# Patient Record
Sex: FEMALE | Race: WHITE | NOT HISPANIC OR LATINO | URBAN - NONMETROPOLITAN AREA
[De-identification: names, ages, dates, MRNs, and addresses within clinical notes are randomized per-mention and may not be internally consistent; named-entity substitution may affect disease eponyms.]

---

## 2024-09-12 ENCOUNTER — APPOINTMENT (OUTPATIENT)
Dept: CARDIOLOGY | Facility: CLINIC | Age: 74
End: 2024-09-12
Payer: COMMERCIAL

## 2024-09-12 VITALS
HEART RATE: 70 BPM | HEIGHT: 66 IN | BODY MASS INDEX: 30.7 KG/M2 | SYSTOLIC BLOOD PRESSURE: 166 MMHG | DIASTOLIC BLOOD PRESSURE: 98 MMHG | WEIGHT: 191 LBS

## 2024-09-12 DIAGNOSIS — R00.2 PALPITATIONS: ICD-10-CM

## 2024-09-12 DIAGNOSIS — R94.31 ABNORMAL EKG: ICD-10-CM

## 2024-09-12 DIAGNOSIS — R07.89 OTHER CHEST PAIN: Primary | ICD-10-CM

## 2024-09-12 DIAGNOSIS — E78.2 MIXED HYPERLIPIDEMIA: ICD-10-CM

## 2024-09-12 DIAGNOSIS — R06.02 SHORTNESS OF BREATH: ICD-10-CM

## 2024-09-12 DIAGNOSIS — I10 ESSENTIAL HYPERTENSION: ICD-10-CM

## 2024-09-12 PROBLEM — E78.5 HYPERLIPIDEMIA: Status: ACTIVE | Noted: 2024-09-12

## 2024-09-12 PROBLEM — I48.0 PAROXYSMAL ATRIAL FIBRILLATION (MULTI): Status: RESOLVED | Noted: 2024-09-12 | Resolved: 2024-09-12

## 2024-09-12 PROBLEM — E78.5 HYPERLIPIDEMIA: Status: RESOLVED | Noted: 2024-09-12 | Resolved: 2024-09-12

## 2024-09-12 PROBLEM — I48.0 PAROXYSMAL ATRIAL FIBRILLATION (MULTI): Status: ACTIVE | Noted: 2024-09-12

## 2024-09-12 PROCEDURE — 3008F BODY MASS INDEX DOCD: CPT | Performed by: INTERNAL MEDICINE

## 2024-09-12 PROCEDURE — 93000 ELECTROCARDIOGRAM COMPLETE: CPT | Performed by: INTERNAL MEDICINE

## 2024-09-12 PROCEDURE — 3077F SYST BP >= 140 MM HG: CPT | Performed by: INTERNAL MEDICINE

## 2024-09-12 PROCEDURE — 1159F MED LIST DOCD IN RCRD: CPT | Performed by: INTERNAL MEDICINE

## 2024-09-12 PROCEDURE — 99204 OFFICE O/P NEW MOD 45 MIN: CPT | Performed by: INTERNAL MEDICINE

## 2024-09-12 PROCEDURE — 3079F DIAST BP 80-89 MM HG: CPT | Performed by: INTERNAL MEDICINE

## 2024-09-12 PROCEDURE — 1036F TOBACCO NON-USER: CPT | Performed by: INTERNAL MEDICINE

## 2024-09-12 RX ORDER — HYDROCHLOROTHIAZIDE 25 MG/1
25 TABLET ORAL DAILY
COMMUNITY

## 2024-09-12 RX ORDER — METOPROLOL TARTRATE 25 MG/1
37.5 TABLET, FILM COATED ORAL 2 TIMES DAILY
Qty: 270 TABLET | Refills: 3 | Status: SHIPPED | OUTPATIENT
Start: 2024-09-12 | End: 2024-09-13 | Stop reason: SDUPTHER

## 2024-09-12 RX ORDER — BISMUTH SUBSALICYLATE 262 MG
1 TABLET,CHEWABLE ORAL DAILY
COMMUNITY

## 2024-09-12 RX ORDER — METOPROLOL TARTRATE 25 MG/1
25 TABLET, FILM COATED ORAL 2 TIMES DAILY
COMMUNITY
End: 2024-09-12 | Stop reason: SDUPTHER

## 2024-09-12 RX ORDER — ASPIRIN 81 MG/1
81 TABLET ORAL EVERY OTHER DAY
COMMUNITY

## 2024-09-12 ASSESSMENT — ENCOUNTER SYMPTOMS: PALPITATIONS: 1

## 2024-09-12 NOTE — LETTER
September 12, 2024     aMtt Atwood DO  [address]  [place] [address]    Patient: Denia Parmar   YOB: 1950   Date of Visit: 9/12/2024       Dear Dr. Matt Atwood, DO:    Thank you for referring Denia Parmar to me for evaluation. Below are my notes for this consultation.  If you have questions, please do not hesitate to call me. I look forward to following your patient along with you.       Sincerely,     Radha Schulz MD      CC: No Recipients  ______________________________________________________________________________________    Cardiology Consultation- New Consult    Reason for referral: Chest pain and shortness of breath    HPI: Denia Parmar is a 73 y.o. female who has been seen by our group in the past.  She scheduled this appointment because of complaint of chest pain and shortness of breath.  She does have a history of hypertension for years.  She was evaluated weight back in 2010 and underwent cardiac catheterization echo and a stress test all appears to be reassuring including negative cardiac catheterization.  Her echocardiogram showed mild increased wall thickness.  She is known to have history of grossly abnormal EKG.  She reports recently had an episode of chest pain at rest.  She did not seek medical attention.  She described as a pressure and heaviness in her chest.  She does report some exertional chest pain and shortness of breath.  She is reasonably active and described functional class II.  She does have some arthritis.  She denies lightheadedness, dizziness or syncope.  Her EKG showed sinus rhythm but dramatic ST-T changes following anterior and lateral ST-T changes suspicious of the possibility of hypertrophic cardiomyopathy.  She describe occasional palpitation with elevated heart rate.    Assessment    1.  Recent complaint of chest pain patient had negative cardiac catheterization back in 2009  2.  Intermittent  complaint of exertional chest pain and shortness of breath  3.  Intermittent palpitation and tachycardia  4.  Grossly abnormal EKG with anterolateral ST-T changes concerning about the possibility of hypertrophic cardiomyopathy  5.  Hyperlipidemia with LDL close to 180 patient meet the criteria for treatment  6.  Hypertension appears suboptimally controlled  7.  Obesity with BMI of 30    Plan    1.  I advised the patient to increase her metoprolol to 37.5 mg twice daily  2.  We discussed nonpharmacologic approach for management of hypertension including low-salt diet and DASH diet  3.  I advised her to proceed with Lexiscan myocardial fusion study and echocardiogram  4.  I advised the patient that she meet criteria for treatment with statin considering elevated risk score her 10 years risk is more than 26% but the patient declined  5.  Follow-up after testing is done  6.  I reviewed her recent lab work with her      Past Medical History:   She has no past medical history on file.    Surgical History:   She has a past surgical history that includes Other surgical history (03/25/2022); Other surgical history (03/25/2022); Other surgical history (03/25/2022); Other surgical history (03/25/2022); Other surgical history (03/25/2022); Other surgical history (03/25/2022); and Shoulder surgery (Right).    Family History:   Family History   Problem Relation Name Age of Onset   • Colon cancer Mother     • Diabetes Mother     • Heart attack Father     • Heart attack Brother         Social History:   Social History     Tobacco Use   • Smoking status: Never   • Smokeless tobacco: Never   Substance Use Topics   • Alcohol use: Never        Allergies:  Sulfamethoxazole-trimethoprim, Penicillamine, and Penicillins     Current Medications:    Current Outpatient Medications:   •  aspirin 81 mg EC tablet, Take 1 tablet (81 mg) by mouth every other day., Disp: , Rfl:   •  hydroCHLOROthiazide (HYDRODiuril) 25 mg tablet, Take 1 tablet (25  "mg) by mouth once daily., Disp: , Rfl:   •  multivitamin tablet, Take 1 tablet by mouth once daily., Disp: , Rfl:   •  metoprolol tartrate (Lopressor) 25 mg tablet, Take 1.5 tablets (37.5 mg) by mouth 2 times a day., Disp: 270 tablet, Rfl: 3     Vitals:  Vitals:    09/12/24 1415 09/12/24 1416   BP: 158/88 (!) 166/98   BP Location: Left arm Right arm   Patient Position: Sitting Sitting   Pulse: 70    Weight: 86.6 kg (191 lb)    Height: 1.676 m (5' 6\")           Review of Systems   Cardiovascular:  Positive for chest pain and palpitations.       Objective        Physical Exam  Constitutional:       Appearance: Normal appearance.   HENT:      Nose: Nose normal.   Neck:      Vascular: No carotid bruit.   Cardiovascular:      Rate and Rhythm: Normal rate.      Pulses: Normal pulses.      Heart sounds: Normal heart sounds.   Pulmonary:      Effort: Pulmonary effort is normal.   Abdominal:      General: Bowel sounds are normal.      Palpations: Abdomen is soft.   Musculoskeletal:         General: Normal range of motion.      Cervical back: Normal range of motion.      Right lower leg: No edema.      Left lower leg: No edema.   Skin:     General: Skin is warm and dry.   Neurological:      General: No focal deficit present.      Mental Status: She is alert.   Psychiatric:         Mood and Affect: Mood normal.         Behavior: Behavior normal.         Thought Content: Thought content normal.         Judgment: Judgment normal.                Assessment and Plan:   1. Other chest pain  Transthoracic Echo Complete    Nuclear Stress Test      2. Abnormal EKG  Transthoracic Echo Complete    Nuclear Stress Test      3. Palpitations  Follow Up In Cardiology    ECG 12 Lead    metoprolol tartrate (Lopressor) 25 mg tablet    Transthoracic Echo Complete    Nuclear Stress Test      4. Shortness of breath  Transthoracic Echo Complete    Nuclear Stress Test      5. Essential hypertension  metoprolol tartrate (Lopressor) 25 mg tablet    "   6. Mixed hyperlipidemia        7. BMI 30.0-30.9,adult               Scribe Attestation  By signing my name below, I, Alexia TURCIOS LPN  , Scribe   attest that this documentation has been prepared under the direction and in the presence of Radha Schulz MD.    Provider Attestation - Scribe documentation    All medical record entries made by the Scribe were at my direction and personally dictated by me. I have reviewed the chart and agree that the record accurately reflects my personal performance of the history, physical exam, discussion and plan.

## 2024-09-12 NOTE — PROGRESS NOTES
Cardiology Consultation- New Consult    Reason for referral: Chest pain and shortness of breath    HPI: Denia Parmar is a 73 y.o. female who has been seen by our group in the past.  She scheduled this appointment because of complaint of chest pain and shortness of breath.  She does have a history of hypertension for years.  She was evaluated weight back in 2010 and underwent cardiac catheterization echo and a stress test all appears to be reassuring including negative cardiac catheterization.  Her echocardiogram showed mild increased wall thickness.  She is known to have history of grossly abnormal EKG.  She reports recently had an episode of chest pain at rest.  She did not seek medical attention.  She described as a pressure and heaviness in her chest.  She does report some exertional chest pain and shortness of breath.  She is reasonably active and described functional class II.  She does have some arthritis.  She denies lightheadedness, dizziness or syncope.  Her EKG showed sinus rhythm but dramatic ST-T changes following anterior and lateral ST-T changes suspicious of the possibility of hypertrophic cardiomyopathy.  She describe occasional palpitation with elevated heart rate.    Assessment    1.  Recent complaint of chest pain patient had negative cardiac catheterization back in 2009  2.  Intermittent complaint of exertional chest pain and shortness of breath  3.  Intermittent palpitation and tachycardia  4.  Grossly abnormal EKG with anterolateral ST-T changes concerning about the possibility of hypertrophic cardiomyopathy  5.  Hyperlipidemia with LDL close to 180 patient meet the criteria for treatment  6.  Hypertension appears suboptimally controlled  7.  Obesity with BMI of 30    Plan    1.  I advised the patient to increase her metoprolol to 37.5 mg twice daily  2.  We discussed nonpharmacologic approach for management of hypertension including low-salt diet and DASH diet  3.  I advised her to proceed  "with Lexiscan myocardial fusion study and echocardiogram  4.  I advised the patient that she meet criteria for treatment with statin considering elevated risk score her 10 years risk is more than 26% but the patient declined  5.  Follow-up after testing is done  6.  I reviewed her recent lab work with her      Past Medical History:   She has no past medical history on file.    Surgical History:   She has a past surgical history that includes Other surgical history (03/25/2022); Other surgical history (03/25/2022); Other surgical history (03/25/2022); Other surgical history (03/25/2022); Other surgical history (03/25/2022); Other surgical history (03/25/2022); and Shoulder surgery (Right).    Family History:   Family History   Problem Relation Name Age of Onset    Colon cancer Mother      Diabetes Mother      Heart attack Father      Heart attack Brother         Social History:   Social History     Tobacco Use    Smoking status: Never    Smokeless tobacco: Never   Substance Use Topics    Alcohol use: Never        Allergies:  Sulfamethoxazole-trimethoprim, Penicillamine, and Penicillins     Current Medications:    Current Outpatient Medications:     aspirin 81 mg EC tablet, Take 1 tablet (81 mg) by mouth every other day., Disp: , Rfl:     hydroCHLOROthiazide (HYDRODiuril) 25 mg tablet, Take 1 tablet (25 mg) by mouth once daily., Disp: , Rfl:     multivitamin tablet, Take 1 tablet by mouth once daily., Disp: , Rfl:     metoprolol tartrate (Lopressor) 25 mg tablet, Take 1.5 tablets (37.5 mg) by mouth 2 times a day., Disp: 270 tablet, Rfl: 3     Vitals:  Vitals:    09/12/24 1415 09/12/24 1416   BP: 158/88 (!) 166/98   BP Location: Left arm Right arm   Patient Position: Sitting Sitting   Pulse: 70    Weight: 86.6 kg (191 lb)    Height: 1.676 m (5' 6\")           Review of Systems   Cardiovascular:  Positive for chest pain and palpitations.       Objective         Physical Exam  Constitutional:       Appearance: Normal " appearance.   HENT:      Nose: Nose normal.   Neck:      Vascular: No carotid bruit.   Cardiovascular:      Rate and Rhythm: Normal rate.      Pulses: Normal pulses.      Heart sounds: Normal heart sounds.   Pulmonary:      Effort: Pulmonary effort is normal.   Abdominal:      General: Bowel sounds are normal.      Palpations: Abdomen is soft.   Musculoskeletal:         General: Normal range of motion.      Cervical back: Normal range of motion.      Right lower leg: No edema.      Left lower leg: No edema.   Skin:     General: Skin is warm and dry.   Neurological:      General: No focal deficit present.      Mental Status: She is alert.   Psychiatric:         Mood and Affect: Mood normal.         Behavior: Behavior normal.         Thought Content: Thought content normal.         Judgment: Judgment normal.                Assessment and Plan:   1. Other chest pain  Transthoracic Echo Complete    Nuclear Stress Test      2. Abnormal EKG  Transthoracic Echo Complete    Nuclear Stress Test      3. Palpitations  Follow Up In Cardiology    ECG 12 Lead    metoprolol tartrate (Lopressor) 25 mg tablet    Transthoracic Echo Complete    Nuclear Stress Test      4. Shortness of breath  Transthoracic Echo Complete    Nuclear Stress Test      5. Essential hypertension  metoprolol tartrate (Lopressor) 25 mg tablet      6. Mixed hyperlipidemia        7. BMI 30.0-30.9,adult               Scribe Attestation  By signing my name below, IAlexia LPN  , Scribe   attest that this documentation has been prepared under the direction and in the presence of Radha Schulz MD.    Provider Attestation - Scribe documentation    All medical record entries made by the Scribe were at my direction and personally dictated by me. I have reviewed the chart and agree that the record accurately reflects my personal performance of the history, physical exam, discussion and plan.

## 2024-09-12 NOTE — PATIENT INSTRUCTIONS
Please bring all medicines, vitamins, and herbal supplements with you when you come to the office.    Prescriptions will not be filled unless you are compliant with your follow up appointments or have a follow up appointment scheduled as per instruction of your physician. Refills should be requested at the time of your visit.     BMI was above normal measurement. Current weight: 86.6 kg (191 lb)  Weight change since last visit (-) denotes wt loss 191 lbs   Weight loss needed to achieve BMI 25: 36.4 Lbs  Weight loss needed to achieve BMI 30: 5.5 Lbs  Provided instructions on dietary changes  Provided instructions on exercise.

## 2024-09-13 DIAGNOSIS — I10 ESSENTIAL HYPERTENSION: ICD-10-CM

## 2024-09-13 DIAGNOSIS — R00.2 PALPITATIONS: ICD-10-CM

## 2024-09-13 RX ORDER — METOPROLOL TARTRATE 25 MG/1
37.5 TABLET, FILM COATED ORAL 2 TIMES DAILY
Qty: 270 TABLET | Refills: 3 | Status: SHIPPED | OUTPATIENT
Start: 2024-09-13 | End: 2025-09-13

## 2024-10-18 ENCOUNTER — TELEPHONE (OUTPATIENT)
Dept: CARDIOLOGY | Facility: CLINIC | Age: 74
End: 2024-10-18
Payer: COMMERCIAL

## 2024-10-18 NOTE — TELEPHONE ENCOUNTER
Patient called stating she had a fever yesterday, cough, and running nose. Patient states symptoms started yesterday and have increased since. She would like to delay lexiscan. Patient states she can walk on the treadmill, and does so daily for about 15 min. Inquired why she is not scheduled for treadmill/Cardiolite.

## 2024-10-21 ENCOUNTER — APPOINTMENT (OUTPATIENT)
Dept: RADIOLOGY | Facility: CLINIC | Age: 74
End: 2024-10-21
Payer: COMMERCIAL

## 2024-10-21 ENCOUNTER — APPOINTMENT (OUTPATIENT)
Dept: CARDIOLOGY | Facility: CLINIC | Age: 74
End: 2024-10-21
Payer: COMMERCIAL

## 2024-10-21 ENCOUNTER — HOSPITAL ENCOUNTER (OUTPATIENT)
Dept: RADIOLOGY | Facility: CLINIC | Age: 74
End: 2024-10-21
Payer: COMMERCIAL

## 2024-11-01 NOTE — TELEPHONE ENCOUNTER
Patient phones with concerns regarding upcoming stress test and echocardiogram. She states her insurance will not cover test. Informed her she has been preapproved, she verbalized understanding.    Patient had lots of questions, answered patient's questions, Mildred RN also assisted in answering patient's questions. Patient states she would like to reschedule lexiscan to be closer to her echo. Pt transferred to scheduling to reschedule, but patient then hung up.    Informed scheduling that patient needs to be contacted to rescheduled lexiscan.

## 2024-11-05 ENCOUNTER — APPOINTMENT (OUTPATIENT)
Dept: RADIOLOGY | Facility: CLINIC | Age: 74
End: 2024-11-05
Payer: COMMERCIAL

## 2024-11-05 ENCOUNTER — APPOINTMENT (OUTPATIENT)
Dept: CARDIOLOGY | Facility: CLINIC | Age: 74
End: 2024-11-05
Payer: COMMERCIAL

## 2024-11-21 ENCOUNTER — HOSPITAL ENCOUNTER (OUTPATIENT)
Dept: CARDIOLOGY | Facility: CLINIC | Age: 74
Discharge: HOME | End: 2024-11-21
Payer: COMMERCIAL

## 2024-11-21 VITALS
DIASTOLIC BLOOD PRESSURE: 90 MMHG | BODY MASS INDEX: 30.7 KG/M2 | SYSTOLIC BLOOD PRESSURE: 156 MMHG | WEIGHT: 191 LBS | HEIGHT: 66 IN

## 2024-11-21 DIAGNOSIS — R06.02 SHORTNESS OF BREATH: ICD-10-CM

## 2024-11-21 DIAGNOSIS — R00.2 PALPITATIONS: ICD-10-CM

## 2024-11-21 DIAGNOSIS — R07.89 OTHER CHEST PAIN: ICD-10-CM

## 2024-11-21 DIAGNOSIS — R94.31 ABNORMAL EKG: ICD-10-CM

## 2024-11-21 LAB
AORTIC VALVE MEAN GRADIENT: 6 MMHG
AORTIC VALVE PEAK VELOCITY: 1.54 M/S
AV PEAK GRADIENT: 9 MMHG
AVA (PEAK VEL): 2.27 CM2
AVA (VTI): 1.67 CM2
EJECTION FRACTION: 60 %
LEFT VENTRICLE INTERNAL DIMENSION DIASTOLE: 4.33 CM (ref 3.5–6)
LEFT VENTRICULAR OUTFLOW TRACT DIAMETER: 2.1 CM
MITRAL VALVE E/A RATIO: 0.39
MITRAL VALVE E/E' RATIO: 10.3
RIGHT VENTRICLE PEAK SYSTOLIC PRESSURE: 37.3 MMHG

## 2024-11-21 PROCEDURE — 93306 TTE W/DOPPLER COMPLETE: CPT

## 2024-11-21 PROCEDURE — 93306 TTE W/DOPPLER COMPLETE: CPT | Performed by: INTERNAL MEDICINE

## 2024-11-22 ENCOUNTER — TELEPHONE (OUTPATIENT)
Dept: CARDIOLOGY | Facility: CLINIC | Age: 74
End: 2024-11-22
Payer: COMMERCIAL

## 2024-11-22 NOTE — TELEPHONE ENCOUNTER
----- Message from Radha Schulz sent at 11/22/2024  9:30 AM EST -----  Advise her echocardiogram showed good heart function some tricuspid regurgitation no new order  ----- Message -----  From: Traci, Syngo - Cardiology Results In  Sent: 11/21/2024   5:38 PM EST  To: Radha Schulz MD

## 2024-11-22 NOTE — TELEPHONE ENCOUNTER
Result Communication    Resulted Orders   Transthoracic Echo Complete   Result Value Ref Range    AV mn grad 6 mmHg    AV pk jaylyn 1.54 m/s    LVOT diam 2.10 cm    MV E/A ratio 0.39     MV avg E/e' ratio 10.30     LV EF 60 %    RVSP 37.3 mmHg    LVIDd 4.33 cm    Aortic Valve Area by Continuity of Peak Velocity 2.27 cm2    AV pk grad 9 mmHg    Aortic Valve Area by Continuity of VTI 1.67 cm2    Narrative                   [facility]  [address], [address], [place] [address]          Tel [phone] Fax [phone]    TRANSTHORACIC ECHOCARDIOGRAM REPORT    Patient Name:        ANYI HOLLAND        Shannan Physician:    [id number] Schulz MD  Study Date:          11/21/2024           Ordering Provider:    [id number] JARVIS SCHULZ  MRN/PID:             [id number]             Fellow:  Accession#:          [id number]         Nurse:  Date of Birth/Age:   1950 / 74      Sonographer:          Kassy peraza                                      RDCS, RVT  Gender Assigned at   F                    Additional Staff:  Birth:  Height:              167.64 cm            Admit Date:  Weight:              86.64 kg             Admission Status:  BSA / BMI:           1.96 m2 / 30.83      Department Location:  [facility]                       kg/m2                                      [facility]  Blood Pressure: 156 /90 mmHg    Study Type:    TRANSTHORACIC ECHO (TTE) COMPLETE  Diagnosis/ICD: Abnormal electrocardiogram [ECG] [EKG]-R94.31;                 Palpitations-R00.2; Other chest pain-R07.89; Shortness of                 breath-R06.02  Indication:    HTN, Hyperlipidemia, Obesity  CPT Codes:     Echo Complete w Full Doppler-[id number]   Study Detail: The following Echo studies were performed: 2D, M-Mode, Doppler and                color flow.        PHYSICIAN INTERPRETATION:  Left Ventricle: The left ventricular systolic function is normal, with a visually estimated ejection fraction of 60%. There is moderate concentric left ventricular hypertrophy. There are no regional wall motion abnormalities. The left ventricular cavity size is normal. There is mildly increased posterior left ventricular wall thickness. Spectral Doppler shows a Grade I (impaired relaxation pattern) of left ventricular diastolic filling with normal left atrial filling pressure. There is a moderate degree of asymmetrical septal left ventricular hypertrophy. No clear CHARO or LVOT obstruction were seen seen.  Left Atrium: The left atrium is normal in size.  Right Ventricle: The right ventricle is normal in size. There is normal right ventricular global systolic function.  Right Atrium: The right atrium is normal in size.  Aortic Valve: The aortic valve is trileaflet. The aortic valve dimensionless index is 0.48. There is no evidence of aortic valve regurgitation. The peak instantaneous gradient of the aortic valve is 9 mmHg. The mean gradient of the aortic valve is 6 mmHg.  Mitral Valve: The mitral valve is normal in structure. The peak instantaneous gradient of the mitral valve is 3 mmHg. There is trace to mild mitral valve regurgitation.  Tricuspid Valve: The tricuspid valve is structurally normal. There is moderate to severe tricuspid regurgitation. The Doppler estimated RVSP is mildly elevated right ventricular systolic pressure at 37.3 mmHg.  Pulmonic Valve: The pulmonic valve is structurally normal. There is no indication of pulmonic valve regurgitation.  Pericardium: No pericardial effusion noted.  Aorta: The aortic root is normal.       CONCLUSIONS:   1. The left ventricular systolic function is normal, with a visually estimated ejection fraction of 60%.   2. Spectral Doppler shows a Grade I (impaired relaxation pattern) of left ventricular diastolic filling with normal left atrial  filling pressure.   3. There is a moderate degree of asymmetrical septal left ventricular hypertrophy. No clear CHARO or LVOT obstruction were seen seen.   4. There is normal right ventricular global systolic function.   5. Trace to mild mitral valve regurgitation.   6. Mildly elevated right ventricular systolic pressure.   7. Moderate to severe tricuspid regurgitation.   8. The study is technically difficult with suboptimal visualization of the endomyocardium. Patient refused Optisol.   9. When compared to previous study the severity of the tricuspid regurgitation has worsened.    QUANTITATIVE DATA SUMMARY:     2D MEASUREMENTS:            Normal Ranges:  Ao Root d:       3.30 cm    (2.0-3.7cm)  LAs:             4.00 cm    (2.7-4.0cm)  RVIDd:           2.71 cm    (0.9-3.6cm)  IVSd:            1.64 cm    (0.6-1.1cm)  LVPWd:           1.10 cm    (0.6-1.1cm)  LVIDd:           4.33 cm    (3.9-5.9cm)  LVIDs:           2.84 cm  LV Mass Index:   115.8 g/m2  LV % FS          34.4 %       LV SYSTOLIC FUNCTION BY 2D PLANIMETRY (MOD):                       Normal Ranges:  EF-Visual:      60 %  LV EF Reported: 60 %       LV DIASTOLIC FUNCTION:           Normal Ranges:  MV Peak E:             0.39 m/s  (0.7-1.2 m/s)  MV Peak A:             1.01 m/s  (0.42-0.7 m/s)  E/A Ratio:             0.39      (1.0-2.2)  MV e'                  0.035 m/s (>8.0)  MV lateral e'          0.04 m/s  MV medial e'           0.03 m/s  E/e' Ratio:            11.20     (<8.0)       MITRAL VALVE:          Normal Ranges:  MV Vmax:      0.91 m/s (<=1.3m/s)  MV peak PG:   3.3 mmHg (<5mmHg)  MV mean P.0 mmHg (<48mmHg)       AORTIC VALVE:                     Normal Ranges:  AoV Vmax:                1.54 m/s (<=1.7m/s)  AoV Peak P.5 mmHg (<20mmHg)  AoV Mean P.0 mmHg (1.7-11.5mmHg)  LVOT Max Thanh:            1.01 m/s (<=1.1m/s)  AoV VTI:                 36.20 cm (18-25cm)  LVOT VTI:                17.50 cm  LVOT Diameter:            2.10 cm  (1.8-2.4cm)  AoV Area, VTI:           1.67 cm2 (2.5-5.5cm2)  AoV Area,Vmax:           2.27 cm2 (2.5-4.5cm2)  AoV Dimensionless Index: 0.48       TRICUSPID VALVE/RVSP:          Normal Ranges:  Peak TR Velocity:     2.93 m/s  RV Syst Pressure:     37 mmHg  (< 30mmHg)       PULMONIC VALVE:          Normal Ranges:  PV Max Thanh:     0.4 m/s  (0.6-0.9m/s)  PV Max P.6 mmHg       [id number] Radha Schulz MD  Electronically signed on 2024 at 5:38:26 PM         ** Final **         11:19 AM      Results were successfully communicated with the patient and they acknowledged their understanding.    Per patient's request, copy  mailed to patient

## 2024-12-02 ENCOUNTER — APPOINTMENT (OUTPATIENT)
Dept: RADIOLOGY | Facility: CLINIC | Age: 74
End: 2024-12-02
Payer: COMMERCIAL

## 2024-12-02 ENCOUNTER — APPOINTMENT (OUTPATIENT)
Dept: CARDIOLOGY | Facility: CLINIC | Age: 74
End: 2024-12-02
Payer: COMMERCIAL

## 2025-01-10 ENCOUNTER — TELEPHONE (OUTPATIENT)
Dept: CARDIOLOGY | Facility: CLINIC | Age: 75
End: 2025-01-10
Payer: COMMERCIAL

## 2025-01-10 NOTE — TELEPHONE ENCOUNTER
Patient called to cancel her nuc stress test and is requesting to change to a GXT. Please advise.    To Dr. Radha Schulz MD

## 2025-01-10 NOTE — TELEPHONE ENCOUNTER
Patient called to cancel stress testing scheduled for Monday. States only wanted GXT and no chemicals her body. Canceled stress as requested. To Dr. Radha Schulz MD for review

## 2025-01-13 ENCOUNTER — APPOINTMENT (OUTPATIENT)
Dept: RADIOLOGY | Facility: CLINIC | Age: 75
End: 2025-01-13
Payer: COMMERCIAL

## 2025-01-13 ENCOUNTER — APPOINTMENT (OUTPATIENT)
Dept: CARDIOLOGY | Facility: CLINIC | Age: 75
End: 2025-01-13
Payer: COMMERCIAL

## 2025-01-13 ENCOUNTER — HOSPITAL ENCOUNTER (OUTPATIENT)
Dept: RADIOLOGY | Facility: CLINIC | Age: 75
End: 2025-01-13
Payer: COMMERCIAL

## 2025-01-23 ENCOUNTER — APPOINTMENT (OUTPATIENT)
Dept: CARDIOLOGY | Facility: CLINIC | Age: 75
End: 2025-01-23
Payer: COMMERCIAL

## 2025-03-27 ENCOUNTER — APPOINTMENT (OUTPATIENT)
Dept: CARDIOLOGY | Facility: CLINIC | Age: 75
End: 2025-03-27
Payer: COMMERCIAL

## 2025-10-07 ENCOUNTER — APPOINTMENT (OUTPATIENT)
Dept: CARDIOLOGY | Facility: CLINIC | Age: 75
End: 2025-10-07
Payer: COMMERCIAL